# Patient Record
Sex: FEMALE | URBAN - METROPOLITAN AREA
[De-identification: names, ages, dates, MRNs, and addresses within clinical notes are randomized per-mention and may not be internally consistent; named-entity substitution may affect disease eponyms.]

---

## 2018-11-08 ENCOUNTER — APPOINTMENT (RX ONLY)
Dept: URBAN - METROPOLITAN AREA CLINIC 52 | Facility: CLINIC | Age: 63
Setting detail: DERMATOLOGY
End: 2018-11-08

## 2018-11-08 DIAGNOSIS — D18.0 HEMANGIOMA: ICD-10-CM

## 2018-11-08 DIAGNOSIS — L82.1 OTHER SEBORRHEIC KERATOSIS: ICD-10-CM

## 2018-11-08 DIAGNOSIS — B35.1 TINEA UNGUIUM: ICD-10-CM

## 2018-11-08 DIAGNOSIS — D22 MELANOCYTIC NEVI: ICD-10-CM

## 2018-11-08 DIAGNOSIS — L29.89 OTHER PRURITUS: ICD-10-CM

## 2018-11-08 DIAGNOSIS — L81.4 OTHER MELANIN HYPERPIGMENTATION: ICD-10-CM

## 2018-11-08 DIAGNOSIS — L57.8 OTHER SKIN CHANGES DUE TO CHRONIC EXPOSURE TO NONIONIZING RADIATION: ICD-10-CM

## 2018-11-08 DIAGNOSIS — L29.8 OTHER PRURITUS: ICD-10-CM

## 2018-11-08 PROBLEM — D18.01 HEMANGIOMA OF SKIN AND SUBCUTANEOUS TISSUE: Status: ACTIVE | Noted: 2018-11-08

## 2018-11-08 PROBLEM — D22.9 MELANOCYTIC NEVI, UNSPECIFIED: Status: ACTIVE | Noted: 2018-11-08

## 2018-11-08 PROCEDURE — ? COUNSELING

## 2018-11-08 PROCEDURE — 99202 OFFICE O/P NEW SF 15 MIN: CPT

## 2018-11-08 ASSESSMENT — LOCATION DETAILED DESCRIPTION DERM
LOCATION DETAILED: RIGHT VENTRAL PROXIMAL FOREARM
LOCATION DETAILED: LEFT DISTAL RADIAL DORSAL FOREARM
LOCATION DETAILED: LEFT VENTRAL PROXIMAL FOREARM
LOCATION DETAILED: RIGHT PLANTAR FOREFOOT OVERLYING 4TH METATARSAL
LOCATION DETAILED: LEFT PROXIMAL LATERAL CALF
LOCATION DETAILED: RIGHT SUPERIOR MEDIAL UPPER BACK
LOCATION DETAILED: RIGHT PROXIMAL DORSAL FOREARM
LOCATION DETAILED: LEFT PLANTAR FOREFOOT OVERLYING 4TH METATARSAL
LOCATION DETAILED: LEFT DISTAL PRETIBIAL REGION
LOCATION DETAILED: RIGHT MEDIAL SUPERIOR CHEST
LOCATION DETAILED: RIGHT PROXIMAL CALF
LOCATION DETAILED: RIGHT PROXIMAL PRETIBIAL REGION

## 2018-11-08 ASSESSMENT — LOCATION SIMPLE DESCRIPTION DERM
LOCATION SIMPLE: RIGHT CALF
LOCATION SIMPLE: LEFT PLANTAR SURFACE
LOCATION SIMPLE: RIGHT UPPER BACK
LOCATION SIMPLE: LEFT PRETIBIAL REGION
LOCATION SIMPLE: RIGHT FOREARM
LOCATION SIMPLE: LEFT CALF
LOCATION SIMPLE: RIGHT PLANTAR SURFACE
LOCATION SIMPLE: LEFT FOREARM
LOCATION SIMPLE: CHEST
LOCATION SIMPLE: RIGHT PRETIBIAL REGION

## 2018-11-08 ASSESSMENT — LOCATION ZONE DERM
LOCATION ZONE: FEET
LOCATION ZONE: TRUNK
LOCATION ZONE: LEG
LOCATION ZONE: ARM

## 2019-12-12 ENCOUNTER — APPOINTMENT (RX ONLY)
Dept: URBAN - METROPOLITAN AREA CLINIC 52 | Facility: CLINIC | Age: 64
Setting detail: DERMATOLOGY
End: 2019-12-12

## 2019-12-12 DIAGNOSIS — L21.8 OTHER SEBORRHEIC DERMATITIS: ICD-10-CM

## 2019-12-12 DIAGNOSIS — D22 MELANOCYTIC NEVI: ICD-10-CM

## 2019-12-12 DIAGNOSIS — D18.0 HEMANGIOMA: ICD-10-CM

## 2019-12-12 DIAGNOSIS — L57.0 ACTINIC KERATOSIS: ICD-10-CM

## 2019-12-12 DIAGNOSIS — L82.1 OTHER SEBORRHEIC KERATOSIS: ICD-10-CM

## 2019-12-12 DIAGNOSIS — L81.4 OTHER MELANIN HYPERPIGMENTATION: ICD-10-CM

## 2019-12-12 DIAGNOSIS — L82.0 INFLAMED SEBORRHEIC KERATOSIS: ICD-10-CM

## 2019-12-12 PROBLEM — D18.01 HEMANGIOMA OF SKIN AND SUBCUTANEOUS TISSUE: Status: ACTIVE | Noted: 2019-12-12

## 2019-12-12 PROBLEM — D22.9 MELANOCYTIC NEVI, UNSPECIFIED: Status: ACTIVE | Noted: 2019-12-12

## 2019-12-12 PROCEDURE — 99213 OFFICE O/P EST LOW 20 MIN: CPT | Mod: 25

## 2019-12-12 PROCEDURE — ? PRESCRIPTION

## 2019-12-12 PROCEDURE — 17003 DESTRUCT PREMALG LES 2-14: CPT | Mod: 59

## 2019-12-12 PROCEDURE — 17000 DESTRUCT PREMALG LESION: CPT | Mod: 59

## 2019-12-12 PROCEDURE — ? LIQUID NITROGEN

## 2019-12-12 PROCEDURE — ? COUNSELING

## 2019-12-12 PROCEDURE — 17110 DESTRUCTION B9 LES UP TO 14: CPT

## 2019-12-12 RX ORDER — CLOBETASOL PROPIONATE 0.5 MG/ML
SOLUTION TOPICAL
Qty: 1 | Refills: 1 | Status: ERX | COMMUNITY
Start: 2019-12-12

## 2019-12-12 RX ADMIN — CLOBETASOL PROPIONATE: 0.5 SOLUTION TOPICAL at 00:00

## 2019-12-12 ASSESSMENT — LOCATION DETAILED DESCRIPTION DERM
LOCATION DETAILED: LEFT SUPERIOR UPPER BACK
LOCATION DETAILED: LEFT LATERAL PROXIMAL PRETIBIAL REGION
LOCATION DETAILED: RIGHT RIB CAGE
LOCATION DETAILED: RIGHT INFERIOR UPPER BACK
LOCATION DETAILED: RIGHT LATERAL ABDOMEN
LOCATION DETAILED: SUBXIPHOID
LOCATION DETAILED: RIGHT SUPERIOR LATERAL MIDBACK
LOCATION DETAILED: LEFT PROXIMAL PRETIBIAL REGION

## 2019-12-12 ASSESSMENT — LOCATION ZONE DERM
LOCATION ZONE: LEG
LOCATION ZONE: TRUNK

## 2019-12-12 ASSESSMENT — LOCATION SIMPLE DESCRIPTION DERM
LOCATION SIMPLE: RIGHT LOWER BACK
LOCATION SIMPLE: LEFT UPPER BACK
LOCATION SIMPLE: ABDOMEN
LOCATION SIMPLE: RIGHT UPPER BACK
LOCATION SIMPLE: LEFT PRETIBIAL REGION

## 2019-12-12 NOTE — PROCEDURE: LIQUID NITROGEN
Medical Necessity Information: It is in your best interest to select a reason for this procedure from the list below. All of these items fulfill various CMS LCD requirements except the new and changing color options.
Consent: The patient's consent was obtained including but not limited to risks of crusting, scabbing, blistering, scarring, darker or lighter pigmentary change, recurrence, incomplete removal and infection.
Detail Level: Detailed
Medical Necessity Clause: This procedure was medically necessary because the lesions that were treated were:
Add 52 Modifier (Optional): no
Include Z78.9 (Other Specified Conditions Influencing Health Status) As An Associated Diagnosis?: Yes
Post-Care Instructions: I reviewed with the patient in detail post-care instructions. Patient is to wear sunprotection, and avoid picking at any of the treated lesions. Pt may apply Vaseline to crusted or scabbing areas.
Post-Care Instructions: CRYOSURGERY\\n\\n\\nThe procedure you have just had using liquid nitrogen is called cryosurgery.  Liquid nitrogen is minus (-) 195.8O C and must be stored in special containers.  It evaporates on contact with the air and becomes water vapor, which is why it appears to smoke.\\n\\nCryosurgery is a surgical technique that avoids cutting, burning or the need for anesthesia.  For selected lesions or growths, cryosurgery is the best treatment because of its safety, minimal post-surgical care and excellent cosmetic results.\\n\\nFollowing treatment, the lesion or growth will appear unchanged.  Soon afterwards, the area will become red and slightly swollen.  Within 24 to 48 hours, a blister or water bubble often appears.  THIS IS NORMAL AND EXPECTED.  If left alone, the blister will slowly resolve and the entire area will turn into a scab.  Regardless of whether the blister breaks or not, the healing will continue as expected.  The scab will fall off by itself when it is ready.  From the day of cryosurgery to the day when the scab falls off is usually about three weeks.  A faint pink spot will be present that will slowly fade away.\\n\\nYou may carry on normal activities immediately after therapy including bathing.  There are no restrictions, however, you should be careful not to irritate or traumatize the area.\\n\\nWhen cryotherapy is used for warts, keep in mind that warts may be very stubborn!  The virus causing the wart may be a strong strain, so the treatment may need to be repeated.  Usually you will know if the wart is still present within three to four weeks, so you may return for another treatment.\\n\\nIf there are any problems or questions, please call our office.
Duration Of Freeze Thaw-Cycle (Seconds): 0

## 2020-07-02 ENCOUNTER — APPOINTMENT (RX ONLY)
Dept: URBAN - METROPOLITAN AREA CLINIC 52 | Facility: CLINIC | Age: 65
Setting detail: DERMATOLOGY
End: 2020-07-02

## 2020-07-02 DIAGNOSIS — L21.8 OTHER SEBORRHEIC DERMATITIS: ICD-10-CM

## 2020-07-02 PROCEDURE — ? COUNSELING

## 2020-07-02 PROCEDURE — ? ADDITIONAL NOTES

## 2020-07-02 PROCEDURE — 99212 OFFICE O/P EST SF 10 MIN: CPT

## 2020-07-02 ASSESSMENT — LOCATION SIMPLE DESCRIPTION DERM: LOCATION SIMPLE: RIGHT SCALP

## 2020-07-02 ASSESSMENT — LOCATION DETAILED DESCRIPTION DERM: LOCATION DETAILED: RIGHT MEDIAL FRONTAL SCALP

## 2020-07-02 ASSESSMENT — LOCATION ZONE DERM: LOCATION ZONE: SCALP

## 2020-07-02 NOTE — PROCEDURE: ADDITIONAL NOTES
Additional Notes: Patient consent was obtained to proceed with the visit and recommended plan of care after discussion of all risks and benefits, including the risks of COVID-19 exposure.
Detail Level: Simple
Additional Notes: Pt was advised that seeing an allergist could be helpful. Pt was advised to use Allegra 180 in the morning, Claritin D for lunch and Benadryl at night.